# Patient Record
Sex: FEMALE | Race: AMERICAN INDIAN OR ALASKA NATIVE
[De-identification: names, ages, dates, MRNs, and addresses within clinical notes are randomized per-mention and may not be internally consistent; named-entity substitution may affect disease eponyms.]

---

## 2017-06-04 ENCOUNTER — HOSPITAL ENCOUNTER (EMERGENCY)
Dept: HOSPITAL 5 - ED | Age: 32
Discharge: HOME | End: 2017-06-04
Payer: SELF-PAY

## 2017-06-04 VITALS — SYSTOLIC BLOOD PRESSURE: 115 MMHG | DIASTOLIC BLOOD PRESSURE: 70 MMHG

## 2017-06-04 DIAGNOSIS — M79.642: ICD-10-CM

## 2017-06-04 DIAGNOSIS — Z3A.16: ICD-10-CM

## 2017-06-04 DIAGNOSIS — O26.892: Primary | ICD-10-CM

## 2017-06-04 PROCEDURE — 99281 EMR DPT VST MAYX REQ PHY/QHP: CPT

## 2017-06-04 NOTE — EMERGENCY DEPARTMENT REPORT
Upper Extremity





- HPI


Chief Complaint: Extremity Injury, Upper


Stated Complaint: LT HAND INJURED


Time Seen by Provider: 06/04/17 18:59


Upper Extremity: Right Hand (Pain shooting pain in lt hand)


Occurred When: 2 Days


Mechanism: Other (Lifting , changing car tire)


Severity: moderate (6/10)


Symptoms: No Pain with Movement, No Deformity, No Limited Range of Movement, No 

Numbness, No Weakness, No Swelling, No Bruising/Ecchymosis, No Laceration or 

Abrasion


Other History: Patient reports shooting pain in both her hands that started 2 

days ago. Pain resolved to rt hand but remains in lt hand. Shooting from wrist. 

This started after she was changing a tire from car. Took tylenol with little 

relief. No numbmess,to hands. Burning pain. Patient said she is 16 weeks 

pregnant followed by OB/GYN. No complaints of vaginal bleed, abdominal pain or 

back pain. Denies fever or nausea or vomiting.





ED Review of Systems


ROS: 


Stated complaint: LT HAND INJURED


Other details as noted in HPI





Comment: All other systems reviewed and negative


Constitutional: denies: chills, fever


ENT: denies: throat pain


Respiratory: no symptoms reported


Cardiovascular: denies: chest pain, palpitations, edema, syncope


Gastrointestinal: denies: abdominal pain, nausea, vomiting


Musculoskeletal: arthralgia.  denies: back pain, joint swelling, myalgia


Skin: denies: rash


Neurological: paresthesias (burning).  denies: headache, weakness, abnormal gait

, vertigo





ED Past Medical Hx





- Past Medical History


Previous Medical History?: No





- Surgical History


Past Surgical History?: No





- Family History


Family history: no significant





- Social History


Smoking Status: Never Smoker


Substance Use Type: None





- Medications


Home Medications: 


 Home Medications











 Medication  Instructions  Recorded  Confirmed  Last Taken  Type


 


Acetaminophen/Codeine [Tylenol 1 tab PO Q12H PRN #6 tab 06/04/17  Unknown Rx





/Codeine # 3 tab]     














Upper Extremity Exam





- Exam


General: 


Vital signs noted. No distress. Alert and acting appropriately.


This is a 32 yo female well nourished well developed in no acute distress


Head and Torso: No HEENT Abnormality (normal exam), No Neck Tenderness (normal 

exam), No Chest/Lungs Abnormality (normal exam), No Abdominal Tenderness (

normal exam), No Back Tenderness (normal exam)


Shoulder Exam: Yes Normal Range of Motion in Shoulder, No Shoulder Tenderness, 

No Clavicle Tenderness, No Shoulder Deformity, No AC Joint Tenderness


Arm Exam: No Arm/Humerus Tenderness, No Arm Deformity


Elbow: Yes Normal Range of Motion in Elbow, No Elbow Tenderness, No Elbow 

Deformity


Forearm: No Forearm Tenderness, No Forearm Deformity, No Pain with Pronation, 

No Pain with Supination


Wrist: Yes Normal ROM in Wrist, No Wrist Tenderness, No Wrist Deformity, No 

Snuffbox Tenderness, No Pain with Axial Thumb Compression


Hand: Yes Normal ROM in Digit(s), No Hand Tenderness, No Hand Deformity, No 

Digit Tenderness, No Digit(s) Deformity, No Tendon Dysfunction


CMS Exam: Yes Normal Distal Pulses, Yes Normal Capillary Refill, Yes Normal 

Distal Sensation, No Broken Skin





ED Course


 Vital Signs











  06/04/17





  18:37


 


Temperature 98.4 F


 


Pulse Rate 60


 


Respiratory 18





Rate 


 


Blood Pressure 115/70


 


Blood Pressure 115/70





[Right] 


 


O2 Sat by Pulse 100





Oximetry 














- Reevaluation(s)


Reevaluation #1: 





06/04/17 19:50


Patient stable and requesting splint and Orthopedic referral





- Orthopedic Splinting/Casting


  ** Injury #1


Side: left


Upper Extremity Injury Location: wrist


Upper Extremity Immobilizer: wrist splint





ED Medical Decision Making





- Medical Decision Making





ED Course: patient With Arthralgia Lt hand with burning pain. Exam is normal 

withou neurovascular compromise. See procedure note for splinting. I discussed 

dx and treatment plan with patient and she voiced understanding. Patient 

discharge home with prescription for Tylenol #3 and Ortho follow up.


Critical care attestation.: 


If time is entered above; I have spent that time in minutes in the direct care 

of this critically ill patient, excluding procedure time.








ED Disposition


Clinical Impression: 


 Arthralgia of left hand





Disposition: DISCHARGED TO HOME OR SELFCARE


Is pt being admited?: No


Does the pt Need Aspirin: No


Condition: Stable


Instructions:  Arthralgia (ED)


Additional Instructions: 


avoid lifting heavy objects


Follow up with orthopedic


Prescriptions: 


Acetaminophen/Codeine [Tylenol /Codeine # 3 tab] 1 tab PO Q12H PRN #6 tab


 PRN Reason: Pain


Referrals: 


FRANKO AWAD MD [Staff Physician] - 2-3 Days


Forms:  Work/School Release Form(ED)


Print Language: ENGLISH

## 2017-06-26 ENCOUNTER — HOSPITAL ENCOUNTER (EMERGENCY)
Dept: HOSPITAL 5 - ED | Age: 32
Discharge: HOME | End: 2017-06-26
Payer: SELF-PAY

## 2017-06-26 VITALS — SYSTOLIC BLOOD PRESSURE: 122 MMHG | DIASTOLIC BLOOD PRESSURE: 70 MMHG
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
## 2017-06-26 DIAGNOSIS — Z3A.00: ICD-10-CM
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
## 2017-06-26 DIAGNOSIS — S16.1XXA: ICD-10-CM

## 2017-06-26 DIAGNOSIS — O26.892: Primary | ICD-10-CM
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
## 2017-06-26 PROCEDURE — 99283 EMERGENCY DEPT VISIT LOW MDM: CPT
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
## 2017-07-12 ENCOUNTER — HOSPITAL ENCOUNTER (EMERGENCY)
Dept: HOSPITAL 5 - ED | Age: 32
Discharge: HOME | End: 2017-07-12
Payer: SELF-PAY

## 2017-07-12 VITALS — DIASTOLIC BLOOD PRESSURE: 85 MMHG | SYSTOLIC BLOOD PRESSURE: 116 MMHG

## 2017-07-12 DIAGNOSIS — S30.860A: ICD-10-CM
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
## 2017-07-12 DIAGNOSIS — O26.892: Primary | ICD-10-CM

## 2017-07-12 DIAGNOSIS — S40.861A: ICD-10-CM
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
## 2017-07-12 DIAGNOSIS — Z3A.21: ICD-10-CM

## 2017-07-12 PROCEDURE — 99282 EMERGENCY DEPT VISIT SF MDM: CPT

## 2017-07-12 NOTE — EMERGENCY DEPARTMENT REPORT
ED Allergic Reaction HPI





- General


Chief complaint: Allergic Reaction


Stated complaint: ANT BITE/SHORTNESS OF BREATH


Source: patient


Mode of arrival: Ambulatory


Limitations: No Limitations





- History of Present Illness


Initial Comments: 





31 year old female presents to ED with allergic reaction after insect bite x3 

days. patient states she is 21 weeks pregnant. patient states she has insect 

bites on right arm and buttock area. patient states she has attempted to apply 

vaseline to insect bites with no relief. patient denies SOB, diff breathing, 

chest pain, tongue swelling. patient is stable, neurologically intact and in no 

acute distress. 


MD Complaint: allergic reaction


-: days(s) (3)


Exposure: insect bite


Symptoms: rash, itching.  denies: facial swelling, lip swelling, difficulty 

swallowing, difficulty breathing, orolingual swelling


Severity: mild


Treatment Prior to Arrival: none





- Related Data


 Previous Rx's











 Medication  Instructions  Recorded  Last Taken  Type


 


Acetaminophen/Codeine [Tylenol 1 tab PO Q12H PRN #6 tab 06/26/17 Unknown Rx





/Codeine # 3 tab]    











 Allergies











Allergy/AdvReac Type Severity Reaction Status Date / Time


 


No Known Allergies Allergy   Verified 07/28/16 18:17














ED Review of Systems


ROS: 


Stated complaint: ANT BITE/SHORTNESS OF BREATH


Other details as noted in HPI





Constitutional: denies: chills, fever


Eyes: denies: eye pain, eye discharge, vision change


ENT: denies: ear pain, throat pain


Respiratory: denies: cough, shortness of breath, wheezing


Cardiovascular: denies: chest pain, palpitations


Endocrine: no symptoms reported


Gastrointestinal: denies: abdominal pain, nausea, diarrhea


Genitourinary: denies: urgency, dysuria, discharge


Musculoskeletal: denies: back pain, joint swelling, arthralgia


Skin: denies: rash, lesions


Neurological: denies: headache, weakness, paresthesias


Psychiatric: denies: anxiety, depression


Hematological/Lymphatic: denies: easy bleeding, easy bruising





ED Past Medical Hx





- Past Medical History


Previous Medical History?: No





- Surgical History


Past Surgical History?: No





- Social History


Smoking Status: Never Smoker


Substance Use Type: None





- Medications


Home Medications: 


 Home Medications











 Medication  Instructions  Recorded  Confirmed  Last Taken  Type


 


Acetaminophen/Codeine [Tylenol 1 tab PO Q12H PRN #6 tab 06/26/17  Unknown Rx





/Codeine # 3 tab]     














ED Physical Exam





- General


Limitations: No Limitations


General appearance: alert, in no apparent distress





- Head


Head exam: Present: atraumatic, normocephalic





- Eye


Eye exam: Present: normal appearance, EOMI





- ENT


ENT exam: Present: normal exam, mucous membranes moist





- Neck


Neck exam: Present: normal inspection, full ROM.  Absent: tenderness





- Respiratory


Respiratory exam: Present: normal lung sounds bilaterally.  Absent: respiratory 

distress, wheezes





- Cardiovascular


Cardiovascular Exam: Present: regular rate, normal rhythm.  Absent: systolic 

murmur, diastolic murmur, rubs, gallop





- GI/Abdominal


GI/Abdominal exam: Present: soft, normal bowel sounds.  Absent: tenderness





- 


External exam: Present: normal external exam (female RN present during time of 

exam)





- Extremities Exam


Extremities exam: Present: normal inspection, full ROM, other (patient has .5cm 

hive present to right anterior upper arm. no surrounding erythema present)





- Back Exam


Back exam: Present: normal inspection





- Neurological Exam


Neurological exam: Present: alert, oriented X3, normal gait





- Psychiatric


Psychiatric exam: Present: normal affect, normal mood





- Skin


Skin exam: Present: warm, dry, intact, normal color, urticaria (right anterior 

upper arm).  Absent: erythema





ED Course


 Vital Signs











  07/12/17 07/12/17





  05:55 06:42


 


Temperature 98.6 F 98.9 F


 


Pulse Rate 65 89


 


Respiratory 16 16





Rate  


 


Blood Pressure 116/85 


 


O2 Sat by Pulse 98 100





Oximetry  














ED Medical Decision Making





- Medical Decision Making





31 year old female presents to ED with insect bite/mild allergic reaction 

hives. no abscess or surrounding erythema present. patient is 21 weeks pregnant 

and has been instructed to use OTC zyrtec and OTC calamine lotion. patient 

denies SOB, diff breathing, facial swelling, chest pain. patient is stable, 

neurologically intact and in no acute distress. 


Critical care attestation.: 


If time is entered above; I have spent that time in minutes in the direct care 

of this critically ill patient, excluding procedure time.








ED Disposition


Clinical Impression: 


Insect bite


Qualifiers:


 Encounter type: initial encounter Qualified Code(s): W57.XXXA - Bitten or 

stung by nonvenomous insect and other nonvenomous arthropods, initial encounter





Disposition: DC-01 TO HOME OR SELFCARE


Is pt being admited?: No


Does the pt Need Aspirin: No


Condition: Stable


Instructions:  Insect Bite or Sting (ED)


Additional Instructions: 


Please use OTC zyrtec (oral antihistamine) and OTC Calamine lotion for allergic 

reaction/insect bite. 


Referrals: 


PRIMARY CARE,MD [Primary Care Provider] - 3-5 Days


Forms:  Work/School Release Form(ED)

## 2017-08-07 ENCOUNTER — HOSPITAL ENCOUNTER (OUTPATIENT)
Dept: HOSPITAL 5 - TRG | Age: 32
LOS: 1 days | Discharge: HOME | End: 2017-08-08
Attending: OBSTETRICS & GYNECOLOGY
Payer: COMMERCIAL

## 2017-08-07 VITALS — SYSTOLIC BLOOD PRESSURE: 112 MMHG | DIASTOLIC BLOOD PRESSURE: 68 MMHG

## 2017-08-07 DIAGNOSIS — Z3A.24: ICD-10-CM

## 2017-08-07 DIAGNOSIS — O21.2: Primary | ICD-10-CM

## 2017-08-07 LAB
BILIRUB UR QL STRIP: (no result)
BLOOD UR QL VISUAL: (no result)
KETONES UR STRIP-MCNC: (no result) MG/DL
LEUKOCYTE ESTERASE UR QL STRIP: (no result)
MUCOUS THREADS #/AREA URNS HPF: (no result) /HPF
NITRITE UR QL STRIP: (no result)
PH UR STRIP: 6 [PH] (ref 5–7)
PROT UR STRIP-MCNC: (no result) MG/DL
RBC #/AREA URNS HPF: 1 /HPF (ref 0–6)
UROBILINOGEN UR-MCNC: < 2 MG/DL (ref ?–2)
WBC #/AREA URNS HPF: 1 /HPF (ref 0–6)

## 2017-08-07 PROCEDURE — 81001 URINALYSIS AUTO W/SCOPE: CPT

## 2017-08-07 PROCEDURE — 59025 FETAL NON-STRESS TEST: CPT

## 2017-08-07 PROCEDURE — 80307 DRUG TEST PRSMV CHEM ANLYZR: CPT

## 2017-08-07 PROCEDURE — 96360 HYDRATION IV INFUSION INIT: CPT

## 2017-08-08 LAB — URINE DRUGS OF ABUSE NOTE: (no result)

## 2017-12-18 ENCOUNTER — HOSPITAL ENCOUNTER (EMERGENCY)
Dept: HOSPITAL 5 - ED | Age: 32
Discharge: HOME | End: 2017-12-18
Payer: COMMERCIAL

## 2017-12-18 VITALS — DIASTOLIC BLOOD PRESSURE: 51 MMHG | SYSTOLIC BLOOD PRESSURE: 117 MMHG

## 2017-12-18 DIAGNOSIS — Z87.891: ICD-10-CM

## 2017-12-18 DIAGNOSIS — R06.00: Primary | ICD-10-CM

## 2017-12-18 LAB
ALBUMIN SERPL-MCNC: 3.9 G/DL (ref 3.9–5)
ALBUMIN/GLOB SERPL: 1.5 %
ALP SERPL-CCNC: 82 UNITS/L (ref 35–129)
ALT SERPL-CCNC: 16 UNITS/L (ref 7–56)
ANION GAP SERPL CALC-SCNC: 19 MMOL/L
APTT BLD: 30 SEC. (ref 24.2–36.6)
BACTERIA #/AREA URNS HPF: (no result) /HPF
BASOPHILS NFR BLD AUTO: 0.3 % (ref 0–1.8)
BILIRUB DIRECT SERPL-MCNC: < 0.2 MG/DL (ref 0–0.2)
BILIRUB INDIRECT SERPL-MCNC: 0 MG/DL
BILIRUB SERPL-MCNC: < 0.2 MG/DL (ref 0.1–1.2)
BILIRUB UR QL STRIP: (no result)
BLOOD UR QL VISUAL: (no result)
BUN SERPL-MCNC: 16 MG/DL (ref 7–17)
BUN/CREAT SERPL: 20 %
CALCIUM SERPL-MCNC: 9.6 MG/DL (ref 8.4–10.2)
CHLORIDE SERPL-SCNC: 102.4 MMOL/L (ref 98–107)
CO2 SERPL-SCNC: 24 MMOL/L (ref 22–30)
EOSINOPHIL NFR BLD AUTO: 1.8 % (ref 0–4.3)
GLUCOSE SERPL-MCNC: 77 MG/DL (ref 65–100)
HCT VFR BLD CALC: 33.4 % (ref 30.3–42.9)
HGB BLD-MCNC: 11.1 GM/DL (ref 10.1–14.3)
INR PPP: 0.91 (ref 0.87–1.13)
KETONES UR STRIP-MCNC: (no result) MG/DL
LEUKOCYTE ESTERASE UR QL STRIP: (no result)
MCH RBC QN AUTO: 31 PG (ref 28–32)
MCHC RBC AUTO-ENTMCNC: 33 % (ref 30–34)
MCV RBC AUTO: 94 FL (ref 79–97)
MUCOUS THREADS #/AREA URNS HPF: (no result) /HPF
NITRITE UR QL STRIP: (no result)
PH UR STRIP: 5 [PH] (ref 5–7)
PLATELET # BLD: 305 K/MM3 (ref 140–440)
POTASSIUM SERPL-SCNC: 4.4 MMOL/L (ref 3.6–5)
PROT SERPL-MCNC: 6.5 G/DL (ref 6.3–8.2)
PROT UR STRIP-MCNC: (no result) MG/DL
RBC # BLD AUTO: 3.58 M/MM3 (ref 3.65–5.03)
RBC #/AREA URNS HPF: < 1 /HPF (ref 0–6)
SODIUM SERPL-SCNC: 141 MMOL/L (ref 137–145)
UROBILINOGEN UR-MCNC: < 2 MG/DL (ref ?–2)
WBC # BLD AUTO: 5 K/MM3 (ref 4.5–11)
WBC #/AREA URNS HPF: 2 /HPF (ref 0–6)

## 2017-12-18 PROCEDURE — 96375 TX/PRO/DX INJ NEW DRUG ADDON: CPT

## 2017-12-18 PROCEDURE — 71275 CT ANGIOGRAPHY CHEST: CPT

## 2017-12-18 PROCEDURE — 85730 THROMBOPLASTIN TIME PARTIAL: CPT

## 2017-12-18 PROCEDURE — 71010: CPT

## 2017-12-18 PROCEDURE — 84484 ASSAY OF TROPONIN QUANT: CPT

## 2017-12-18 PROCEDURE — 99284 EMERGENCY DEPT VISIT MOD MDM: CPT

## 2017-12-18 PROCEDURE — 80048 BASIC METABOLIC PNL TOTAL CA: CPT

## 2017-12-18 PROCEDURE — 74177 CT ABD & PELVIS W/CONTRAST: CPT

## 2017-12-18 PROCEDURE — 93005 ELECTROCARDIOGRAM TRACING: CPT

## 2017-12-18 PROCEDURE — 96376 TX/PRO/DX INJ SAME DRUG ADON: CPT

## 2017-12-18 PROCEDURE — 85025 COMPLETE CBC W/AUTO DIFF WBC: CPT

## 2017-12-18 PROCEDURE — 36415 COLL VENOUS BLD VENIPUNCTURE: CPT

## 2017-12-18 PROCEDURE — 93010 ELECTROCARDIOGRAM REPORT: CPT

## 2017-12-18 PROCEDURE — 85610 PROTHROMBIN TIME: CPT

## 2017-12-18 PROCEDURE — 93975 VASCULAR STUDY: CPT

## 2017-12-18 PROCEDURE — 96374 THER/PROPH/DIAG INJ IV PUSH: CPT

## 2017-12-18 PROCEDURE — 80074 ACUTE HEPATITIS PANEL: CPT

## 2017-12-18 PROCEDURE — 81001 URINALYSIS AUTO W/SCOPE: CPT

## 2017-12-18 NOTE — XRAY REPORT
AP CHEST:



HISTORY: Shortness of breath



AP view of the chest demonstrates a normal mediastinal and

cardiac contour with clear lungs and normal bony and soft tissue

structures.



IMPRESSION:

Unremarkable AP chest.

## 2017-12-18 NOTE — CAT SCAN REPORT
CTA CHEST:



HISTORY: Chest pain, shortness of breath.



COMPARISON: none.



TECHNIQUE: Helical CT in 1.25mm intervals following IV contrast.  

Pulmonary embolus protocol.  Sagittal and coronal reformatted images.  

Rotational MIP images.



FINDINGS:





Contrast bolus is satisfactory.  No pulmonary embolus is identified.



Thyroid gland: Normal.



Tracheobronchial tree: Normal.



Esophagus: Normal.



Heart: Normal.



Pericardium: Normal.



Mediastinum: Normal.



Lung Fields: normal.



Pleural Spaces: Normal.



Musculoskeletal: Normal.





IMPRESSION: 

No evidence for pulmonary embolus.  

Unremarkable CT chest with contrast.

## 2017-12-18 NOTE — ULTRASOUND REPORT
FINAL REPORT



EXAM:  US PELVIS DUPLEX DOPPLER COMP



HISTORY:  lower abd pain 



TECHNIQUE:  Transabdominal pelvic ultrasound.



PRIORS:  None currently available.



FINDINGS:  





Uterus: 10.8 x 5.1 x 6.8 cm. Anteverted. No distinct lesions.

Homogeneous. 



Endometrium: 13 mm. Echogenic Aleshia heterogeneous. Within normal

limits for a menstruating patient. 



Right ovary: Not examined because patient requested examination

to terminate 



Left ovary: 4.0 x 2.1 x 2.7 cm. 2.3 cm follicular type cyst. No

complexity. Flow is present to the left ovary. 



No adnexal lesions. 



No significant free fluid. 



IMPRESSION:  

Left ovarian follicular type cyst. 



Right ovary not examined because patient ended study prematurely.





Otherwise unremarkable.

## 2017-12-18 NOTE — CAT SCAN REPORT
CT ABDOMEN PELVIS WITH CONTRAST:



HISTORY:  abdominal pain.



COMPARISON: none.



TECHNIQUE:  Helical CT in 1.25mm intervals following IV contrast. 

Sagittal and coronal reconstructions. 





FINDINGS:



Lung bases: Normal.



Liver: Normal.



Biliary system: Normal.



Pancreas: Normal.



Spleen: Normal.



Kidneys/ureters/bladder: Normal.



Adrenal glands: Normal.



Aorta: Normal.



Intestines: Normal.



Appendix: Normal.



Pelvic viscera: Postpartum uterus. The right ovary is normal. A 2.1 cm 

left ovarian cyst is identified.



Ascites: None.



Adenopathy: None.



Musculoskeletal: Normal.





IMPRESSION:

No acute abdominal process is identified.

2.1 cm left ovarian cyst.

## 2017-12-21 ENCOUNTER — HOSPITAL ENCOUNTER (EMERGENCY)
Dept: HOSPITAL 5 - ED | Age: 32
LOS: 1 days | Discharge: LEFT BEFORE DISCHARGE | End: 2017-12-22
Payer: COMMERCIAL

## 2017-12-21 DIAGNOSIS — R10.9: Primary | ICD-10-CM

## 2017-12-21 DIAGNOSIS — Z53.21: ICD-10-CM

## 2017-12-21 DIAGNOSIS — R06.09: ICD-10-CM

## 2017-12-27 ENCOUNTER — HOSPITAL ENCOUNTER (EMERGENCY)
Dept: HOSPITAL 5 - ED | Age: 32
Discharge: LEFT BEFORE DISCHARGE | End: 2017-12-27
Payer: COMMERCIAL

## 2017-12-27 VITALS — SYSTOLIC BLOOD PRESSURE: 173 MMHG | DIASTOLIC BLOOD PRESSURE: 78 MMHG

## 2017-12-27 DIAGNOSIS — R06.02: Primary | ICD-10-CM

## 2017-12-27 DIAGNOSIS — Z53.21: ICD-10-CM

## 2018-02-01 ENCOUNTER — HOSPITAL ENCOUNTER (EMERGENCY)
Dept: HOSPITAL 5 - ED | Age: 33
Discharge: HOME | End: 2018-02-01
Payer: COMMERCIAL

## 2018-02-01 VITALS — DIASTOLIC BLOOD PRESSURE: 90 MMHG | SYSTOLIC BLOOD PRESSURE: 175 MMHG

## 2018-02-01 DIAGNOSIS — J40: Primary | ICD-10-CM

## 2018-02-01 LAB
BASOPHILS # (AUTO): 0 K/MM3 (ref 0–0.1)
BASOPHILS NFR BLD AUTO: 0.5 % (ref 0–1.8)
BUN SERPL-MCNC: 19 MG/DL (ref 7–17)
BUN/CREAT SERPL: 19 %
CALCIUM SERPL-MCNC: 9.6 MG/DL (ref 8.4–10.2)
EOSINOPHIL # BLD AUTO: 0.1 K/MM3 (ref 0–0.4)
EOSINOPHIL NFR BLD AUTO: 2.3 % (ref 0–4.3)
HCT VFR BLD CALC: 39.7 % (ref 30.3–42.9)
HEMOLYSIS INDEX: 7
HGB BLD-MCNC: 13.2 GM/DL (ref 10.1–14.3)
LYMPHOCYTES # BLD AUTO: 2.3 K/MM3 (ref 1.2–5.4)
LYMPHOCYTES NFR BLD AUTO: 44.2 % (ref 13.4–35)
MCH RBC QN AUTO: 31 PG (ref 28–32)
MCHC RBC AUTO-ENTMCNC: 33 % (ref 30–34)
MCV RBC AUTO: 92 FL (ref 79–97)
MONOCYTES # (AUTO): 0.5 K/MM3 (ref 0–0.8)
MONOCYTES % (AUTO): 8.8 % (ref 0–7.3)
PLATELET # BLD: 316 K/MM3 (ref 140–440)
RBC # BLD AUTO: 4.3 M/MM3 (ref 3.65–5.03)

## 2018-02-01 PROCEDURE — 93010 ELECTROCARDIOGRAM REPORT: CPT

## 2018-02-01 PROCEDURE — 84703 CHORIONIC GONADOTROPIN ASSAY: CPT

## 2018-02-01 PROCEDURE — 93005 ELECTROCARDIOGRAM TRACING: CPT

## 2018-02-01 PROCEDURE — 85025 COMPLETE CBC W/AUTO DIFF WBC: CPT

## 2018-02-01 PROCEDURE — 71046 X-RAY EXAM CHEST 2 VIEWS: CPT

## 2018-02-01 PROCEDURE — 84484 ASSAY OF TROPONIN QUANT: CPT

## 2018-02-01 PROCEDURE — 80048 BASIC METABOLIC PNL TOTAL CA: CPT

## 2018-02-01 PROCEDURE — 36415 COLL VENOUS BLD VENIPUNCTURE: CPT

## 2018-02-01 NOTE — EMERGENCY DEPARTMENT REPORT
HPI





- General


Chief Complaint: Chest Pain


Time Seen by Provider: 02/01/18 09:55





- HPI


HPI: 





The patient is a 32-year-old female presents for evaluation of cough and chest 

pain.  She states that has experienced on and off cough and chest pain for the 

past 4-6 weeks.  She states that her pain is currently 9/10 in severity, 

Worsened and severe for the past one week, exacerbated with coughing, aching in 

quality. The patient denies fever, neck pain, parasthesias, hemoptysis, 

palpitations, dizziness, syncope, unilateral leg swelling, calf muscle pain.  

Patient also denies cocaine or other stimulant use, oral contraception use, 

history of DVT or PE, recent immobilization, or history of cancer.








ED Past Medical Hx





- Past Medical History


Previous Medical History?: Yes


Hx Hypertension: No


Hx Diabetes: No


Hx Deep Vein Thrombosis: No


Hx Renal Disease: No


Hx Sickle Cell Disease: No


Hx Seizures: No


Hx Asthma: No


Hx HIV: No


Additional medical history: Vaginal delivery x 3





- Surgical History


Past Surgical History?: No





- Social History


Smoking Status: Former Smoker


Substance Use Type: None





- Medications


Home Medications: 


 Home Medications











 Medication  Instructions  Recorded  Confirmed  Last Taken  Type


 


Acetaminophen/Codeine [Tylenol 1 tab PO Q12H PRN #6 tab 06/26/17  Unknown Rx





/Codeine # 3 tab]     


 


Naproxen [Naprosyn] 500 mg PO BID #10 tablet 12/18/17  Unknown Rx


 


Azithromycin [Zithromax Z-МАРИНА] 250 mg PO QDAY #6 tablet 02/01/18  Unknown Rx


 


Benzonatate [Tessalon Perles] 100 mg PO Q8HR #20 capsule 02/01/18  Unknown Rx


 


Ibuprofen [Motrin] 800 mg PO Q8HR PRN #15 tablet 02/01/18  Unknown Rx


 


traMADol [Ultram 50 MG tab] 50 mg PO Q6HR PRN #10 tablet 02/01/18  Unknown Rx














ED Review of Systems


ROS: 


Stated complaint: URI SX


Other details as noted in HPI





Constitutional: denies: fever


ENT: denies: throat or neck pain


Respiratory: denies: cough reports shortness of breath


Cardiovascular: reports chest pain


Endocrine: denies unexplained weight loss or gain


Gastrointestinal: denies: abdominal pain, nausea


Genitourinary: denies: dysuria


Musculoskeletal: denies: leg swelling


Skin: denies: rash


Neurological: denies: headache


Hematological/Lymphatic: denies: easy bleeding or easy bruising  


Psych: denies sadness or hopelessness











Physical Exam





- Physical Exam


Vital Signs: 


 Vital Signs











  02/01/18 02/01/18





  01:53 10:00


 


Temperature 98.6 F 98.1 F


 


Pulse Rate 58 L 55 L


 


Respiratory 18 23





Rate  


 


Blood Pressure 134/74 


 


Blood Pressure  138/75





[Left]  


 


O2 Sat by Pulse 98 98





Oximetry  











Physical Exam: 








General: well-nourished, well-developed, no acute distress


Head: Normocephalic, atraumatic


Eyes: normal sclera


ENT: Mucous membranes are pale and dry


Neck: trachea midline, neck supple, No neck stiffness, no cervical adenopathy


Respiratory: Breath sounds equal bilaterally, no wheezing, rales, or rhonchi


Cardio: S1 and S2 present, no murmurs, rubs, gallops, capillary refill is 

delayed


Abdomen: Normoactive bowel sounds, soft abdomen, no rigidity, no guarding or 

rebound tenderness


Musc: No pitting edema


Skin: No rash


Neuro: no facial drooping, normal speech


Psych: Normal affect








ED Course


 Vital Signs











  02/01/18 02/01/18





  01:53 10:00


 


Temperature 98.6 F 98.1 F


 


Pulse Rate 58 L 55 L


 


Respiratory 18 23





Rate  


 


Blood Pressure 134/74 


 


Blood Pressure  138/75





[Left]  


 


O2 Sat by Pulse 98 98





Oximetry  














ED Medical Decision Making





- Lab Data


Result diagrams: 


 02/01/18 02:20





 02/01/18 02:20





- Medical Decision Making








The patient was seen and examined by myself.  The patient is placed on a 

cardiac monitor and continuous pulse ox. On initial evaluation, the patient was 

found to be in no distress.  EKG was negative for findings suggestive of acute 

cardiac infarct. Labs and imaging are obtained.  The patient is given a tablet 

of Motrin for her pain.  Chest x-ray is negative for pneumothorax, focal 

consolidation, pulmonary vascular congestion, pleural effusion, or other 

obvious acute cardiopulmonary disease process.  Lab results were non-concerning 

including levels of troponin, WBC, hemoglobin, hematocrit, electrolytes, renal 

function. The patient was reevaluated and reported that their symptoms were 

markedly improved.  As the patient has a DOROTHY risk score less than 2, and a well

's score less than 2, the patient is at low risk of ACS or pulmonary emboli 

etiology of their symptoms. The patient is stable for discharge with outpatient 

follow-up.  The patient is given follow-up and return instructions.  The 

patient expressed understanding and agreed with the plan.  The patient is 

discharged in stable condition.





Critical care attestation.: 


If time is entered above; I have spent that time in minutes in the direct care 

of this critically ill patient, excluding procedure time.








ED Disposition


Clinical Impression: 


 Acute chest pain, Bronchitis





Disposition: DC-01 TO HOME OR SELFCARE


Is pt being admited?: No


Does the pt Need Aspirin: No


Condition: Stable


Instructions:  Chest Pain (ED), Costochondritis (ED), Chronic Bronchitis (ED)


Prescriptions: 


Azithromycin [Zithromax Z-МАРИНА] 250 mg PO QDAY #6 tablet


Benzonatate [Tessalon Perles] 100 mg PO Q8HR #20 capsule


Ibuprofen [Motrin] 800 mg PO Q8HR PRN #15 tablet


 PRN Reason: Pain


traMADol [Ultram 50 MG tab] 50 mg PO Q6HR PRN #10 tablet


 PRN Reason: Pain


Referrals: 


TRINA LOPEZ PA [Primary Care Provider] - 3-5 Days


Time of Disposition: 11:11

## 2018-05-16 ENCOUNTER — HOSPITAL ENCOUNTER (EMERGENCY)
Dept: HOSPITAL 5 - ED | Age: 33
Discharge: HOME | End: 2018-05-16
Payer: COMMERCIAL

## 2018-05-16 VITALS — DIASTOLIC BLOOD PRESSURE: 69 MMHG | SYSTOLIC BLOOD PRESSURE: 159 MMHG

## 2018-05-16 DIAGNOSIS — L25.9: Primary | ICD-10-CM

## 2018-05-16 PROCEDURE — 99282 EMERGENCY DEPT VISIT SF MDM: CPT

## 2018-05-16 NOTE — EMERGENCY DEPARTMENT REPORT
HPI





- General


Chief Complaint: Skin Rash


Time Seen by Provider: 05/16/18 14:12





- HPI


HPI: 





Patient is a 32-year-old female who presents a rash to her right wrist region.  

Patient states that on Sunday 4 days ago she went to [facility] and brought a 

sunburn gel which she applied to her wrist.  Patient states since then she has 

had some rash on her wrists where she applied the gel.


Patient states that she is currently itching intermittently throughout the day.

  She denies fevers/chills/nausea vomiting or any other symptoms.





ED Past Medical Hx





- Past Medical History


Hx Hypertension: No


Hx Diabetes: No


Hx Deep Vein Thrombosis: No


Hx Renal Disease: No


Hx Sickle Cell Disease: No


Hx Seizures: No


Hx Asthma: No


Hx HIV: No


Additional medical history: Vaginal delivery x 3





- Social History


Smoking Status: Never Smoker


Substance Use Type: None





- Medications


Home Medications: 


 Home Medications











 Medication  Instructions  Recorded  Confirmed  Last Taken  Type


 


Acetaminophen/Codeine [Tylenol 1 tab PO Q12H PRN #6 tab 06/26/17  Unknown Rx





/Codeine # 3 tab]     


 


Naproxen [Naprosyn] 500 mg PO BID #10 tablet 12/18/17  Unknown Rx


 


Azithromycin [Zithromax Z-МАРИНА] 250 mg PO QDAY #6 tablet 02/01/18  Unknown Rx


 


Benzonatate [Tessalon Perles] 100 mg PO Q8HR #20 capsule 02/01/18  Unknown Rx


 


Ibuprofen [Motrin] 800 mg PO Q8HR PRN #15 tablet 02/01/18  Unknown Rx


 


traMADol [Ultram 50 MG tab] 50 mg PO Q6HR PRN #10 tablet 02/01/18  Unknown Rx


 


Hydrocortisone 0.5% 1 applicatio TP TID #1 tube 05/16/18  Unknown Rx





[Hydrocortisone 0.5% CREAM]     


 


diphenhydrAMINE [Benadryl CAP] 25 mg PO QHS PRN #30 capsule 05/16/18  Unknown Rx














ED Review of Systems


ROS: 


Stated complaint: RASH ON HAND AND EYE


Other details as noted in HPI





Constitutional: denies: chills, fever


Eyes: denies: eye pain, eye discharge, vision change


ENT: denies: ear pain, throat pain


Respiratory: denies: cough, shortness of breath, wheezing


Cardiovascular: denies: chest pain, palpitations


Endocrine: no symptoms reported


Gastrointestinal: denies: abdominal pain, nausea, diarrhea


Genitourinary: denies: urgency, dysuria, discharge


Musculoskeletal: denies: back pain, joint swelling, arthralgia


Skin: denies: rash, lesions


Neurological: denies: headache, weakness, paresthesias


Psychiatric: denies: anxiety, depression


Hematological/Lymphatic: denies: easy bleeding, easy bruising





Physical Exam





- Physical Exam


Vital Signs: 


 Vital Signs











  05/16/18





  12:48


 


Temperature 97.9 F


 


Pulse Rate 64


 


Respiratory 18





Rate 


 


Blood Pressure 159/69


 


O2 Sat by Pulse 100





Oximetry 











Physical Exam: 





GENERAL: Alert and oriented x3, no apparent distress, Normal Gait, atraumatic.


HEAD: Head is normocephalic and a-traumatic.





MOUTH:Mouth is well hydrated and without lesions. Tonsils nonerythematous or 

swollen,  Uvula midline, Tongue not elevated. Mucous membranes are moist. 

Posterior pharynx clear, no exudate or lesions. Patent airways.


NECK: Supple. Non edematous, No carotid bruits.  No lymphadenopathy or 

thyromegaly.  No C-spine tenderness


LUNGS: Symetrical with respiration, No wheezing, no rales or crackles, CTAB.


HEART:  S1, S2 present, regular rate and rhythm without murmur, no rubs, no 

gallops. Non tender to palpation





SKIN:  Warm and dry, generalized, erythematous, pinpoint rash localized to 

right wrist region.  Nontender to palpation, not draining, No other lesions, No 

ulceration or induration present.  Tattoos seen on wrist and arm











ED Course


 Vital Signs











  05/16/18





  12:48


 


Temperature 97.9 F


 


Pulse Rate 64


 


Respiratory 18





Rate 


 


Blood Pressure 159/69


 


O2 Sat by Pulse 100





Oximetry 














ED Medical Decision Making





- Medical Decision Making


31-year-old female presents with simple contact dermatitis


Discussed Benadryl and hydrocortisone home medications


Patient had no respiratory acute distress in in the ED


Discussed to follow up with primary care.





Critical care attestation.: 


If time is entered above; I have spent that time in minutes in the direct care 

of this critically ill patient, excluding procedure time.








ED Disposition


Clinical Impression: 


 Contact dermatitis





Disposition: DC-01 TO HOME OR SELFCARE


Is pt being admited?: No


Does the pt Need Aspirin: No


Condition: Stable


Instructions:  Contact Dermatitis (ED)


Additional Instructions: 


Make sure to follow up with the primary care physician as discussed.


Take all your medications as you've been prescribed.


If you have any worsening symptoms or develop new symptoms please return to ED 

immediately.


Prescriptions: 


diphenhydrAMINE [Benadryl CAP] 25 mg PO QHS PRN #30 capsule


 PRN Reason: Allergic Reaction


Hydrocortisone 0.5% [Hydrocortisone 0.5% CREAM] 1 applicatio TP TID #1 tube


Referrals: 


PRIMARY CARE,MD [Primary Care Provider] - 3-5 Days


[facility] [Outside] - 3-5 Days


[facility] [Outside] - 3-5 Days


Forms:  Work/School Release Form(ED)


Time of Disposition: 14:58

## 2018-10-03 ENCOUNTER — HOSPITAL ENCOUNTER (EMERGENCY)
Dept: HOSPITAL 5 - ED | Age: 33
Discharge: HOME | End: 2018-10-03
Payer: COMMERCIAL

## 2018-10-03 VITALS — SYSTOLIC BLOOD PRESSURE: 130 MMHG | DIASTOLIC BLOOD PRESSURE: 89 MMHG

## 2018-10-03 DIAGNOSIS — J40: Primary | ICD-10-CM

## 2018-10-03 PROCEDURE — 99283 EMERGENCY DEPT VISIT LOW MDM: CPT

## 2018-10-03 PROCEDURE — 71046 X-RAY EXAM CHEST 2 VIEWS: CPT

## 2018-10-03 NOTE — EMERGENCY DEPARTMENT REPORT
- General


Chief Complaint: Upper Respiratory Infection


Stated Complaint: COLD


Time Seen by Provider: 10/03/18 09:04


Source: patient


Mode of arrival: Ambulatory


Limitations: No Limitations





- History of Present Illness


Initial Comments: 





This is a 32-year-old female nontoxic, well nourished in appearance, no acute 

signs of distress presents to the ED with c/o of productive cough, sore throat, 

ear pain, body aches, rhinorrhea, nasal congestion x1 week.  Patient describes 

productive cough as yellow mucus production.  Patient denies any sick contact.  

Patient denies any recent travels, long car, recent hospital stays.  Patient 

denies any calf pain or calf tenderness.  Patient denies any chest pain, short 

of breath, fever, chills, nausea, vomiting, hemoptysis, numbness, tingling, 

headache or stiff neck. Patient denies any allergies or significant PMH.


MD Complaint: cough, sore throat, rhinorrhea, nasal congestion


-: week(s) (1)


Severity: mild


Severity scale (0 -10): 8


Quality: aching


Consistency: constant


Improves With: nothing


Worsens With: nothing


Associated Symptoms: rhinorrhea, nasal congestion, sore throat, cough, ear 

pain.  denies: fever, chills, myalgias, diaphoresis, headache, stiff neck, 

chest pain, shortness of breath, abdominal pain, nausea, vomiting, diarrhea, 

dysuria, rash, confusion, right sweats, epistaxis, hoarseness


Treatments Prior to Arrival: none





- Related Data


 Previous Rx's











 Medication  Instructions  Recorded  Last Taken  Type


 


Acetaminophen/Codeine [Tylenol 1 tab PO Q12H PRN #6 tab 06/26/17 Unknown Rx





/Codeine # 3 tab]    


 


Naproxen [Naprosyn] 500 mg PO BID #10 tablet 12/18/17 Unknown Rx


 


Azithromycin [Zithromax Z-МАРИНА] 250 mg PO QDAY #6 tablet 02/01/18 Unknown Rx


 


Benzonatate [Tessalon Perles] 100 mg PO Q8HR #20 capsule 02/01/18 Unknown Rx


 


Ibuprofen [Motrin] 800 mg PO Q8HR PRN #15 tablet 02/01/18 Unknown Rx


 


traMADol [Ultram 50 MG tab] 50 mg PO Q6HR PRN #10 tablet 02/01/18 Unknown Rx


 


Hydrocortisone 0.5% 1 applicatio TP TID #1 tube 05/16/18 Unknown Rx





[Hydrocortisone 0.5% CREAM]    


 


diphenhydrAMINE [Benadryl CAP] 25 mg PO QHS PRN #30 capsule 05/16/18 Unknown Rx


 


Azithromycin [Zithromax Z-МАРИНА] 250 mg PO DAILY #6 tablet 10/03/18 Unknown Rx


 


Benzonatate [Tessalon Perle] 100 mg PO Q8H PRN #20 capsule 10/03/18 Unknown Rx


 


Ibuprofen [Motrin] 600 mg PO Q8H PRN #20 tablet 10/03/18 Unknown Rx


 


Prednisone [predniSONE 10 mg 10 mg PO .TAPER #1 tab.ds.pk 10/03/18 Unknown Rx





(6-Day Pack, 21 Tabs)]    











 Allergies











Allergy/AdvReac Type Severity Reaction Status Date / Time


 


No Known Allergies Allergy   Verified 10/03/18 08:39














ED Review of Systems


ROS: 


Stated complaint: COLD


Other details as noted in HPI





Constitutional: denies: chills, fever


Eyes: denies: eye pain, eye discharge, vision change


ENT: ear pain, throat pain


Respiratory: cough.  denies: shortness of breath, wheezing


Cardiovascular: denies: chest pain, palpitations


Endocrine: no symptoms reported


Gastrointestinal: denies: abdominal pain, nausea, diarrhea


Genitourinary: denies: urgency, dysuria, discharge


Musculoskeletal: denies: back pain, joint swelling, arthralgia


Skin: denies: rash, lesions


Neurological: denies: headache, weakness, paresthesias


Psychiatric: denies: anxiety, depression


Hematological/Lymphatic: denies: easy bleeding, easy bruising





ED Past Medical Hx





- Past Medical History


Previous Medical History?: No


Hx Hypertension: No


Hx Diabetes: No


Hx Deep Vein Thrombosis: No


Hx Renal Disease: No


Hx Sickle Cell Disease: No


Hx Seizures: No


Hx Asthma: No


Hx HIV: No


Additional medical history: Vaginal delivery x 3





- Surgical History


Past Surgical History?: No





- Social History


Smoking Status: Never Smoker


Substance Use Type: None





- Medications


Home Medications: 


 Home Medications











 Medication  Instructions  Recorded  Confirmed  Last Taken  Type


 


Acetaminophen/Codeine [Tylenol 1 tab PO Q12H PRN #6 tab 06/26/17  Unknown Rx





/Codeine # 3 tab]     


 


Naproxen [Naprosyn] 500 mg PO BID #10 tablet 12/18/17  Unknown Rx


 


Azithromycin [Zithromax Z-МАРИНА] 250 mg PO QDAY #6 tablet 02/01/18  Unknown Rx


 


Benzonatate [Tessalon Perles] 100 mg PO Q8HR #20 capsule 02/01/18  Unknown Rx


 


Ibuprofen [Motrin] 800 mg PO Q8HR PRN #15 tablet 02/01/18  Unknown Rx


 


traMADol [Ultram 50 MG tab] 50 mg PO Q6HR PRN #10 tablet 02/01/18  Unknown Rx


 


Hydrocortisone 0.5% 1 applicatio TP TID #1 tube 05/16/18  Unknown Rx





[Hydrocortisone 0.5% CREAM]     


 


diphenhydrAMINE [Benadryl CAP] 25 mg PO QHS PRN #30 capsule 05/16/18  Unknown Rx


 


Azithromycin [Zithromax Z-МАРИНА] 250 mg PO DAILY #6 tablet 10/03/18  Unknown Rx


 


Benzonatate [Tessalon Perle] 100 mg PO Q8H PRN #20 capsule 10/03/18  Unknown Rx


 


Ibuprofen [Motrin] 600 mg PO Q8H PRN #20 tablet 10/03/18  Unknown Rx


 


Prednisone [predniSONE 10 mg 10 mg PO .TAPER #1 tab.ds.pk 10/03/18  Unknown Rx





(6-Day Pack, 21 Tabs)]     














ED Physical Exam





- General


Limitations: No Limitations


General appearance: alert, in no apparent distress





- Head


Head exam: Present: atraumatic, normocephalic





- Eye


Eye exam: Present: normal appearance


Pupils: Present: normal accommodation





- ENT


ENT exam: Present: normal exam, normal orophraynx, mucous membranes moist, TM's 

normal bilaterally, normal external ear exam





- Neck


Neck exam: Present: normal inspection, full ROM.  Absent: tenderness, 

meningismus, lymphadenopathy





- Respiratory


Respiratory exam: Present: normal lung sounds bilaterally.  Absent: respiratory 

distress, wheezes, rales, rhonchi, stridor, chest wall tenderness, accessory 

muscle use, decreased breath sounds, prolonged expiratory





- Cardiovascular


Cardiovascular Exam: Present: regular rate, normal rhythm, normal heart sounds.

  Absent: irregular rhythm, systolic murmur, diastolic murmur, rubs, gallop





- GI/Abdominal


GI/Abdominal exam: Present: soft, normal bowel sounds.  Absent: distended, 

tenderness, guarding, rebound, rigid, diminished bowel sounds





- Extremities Exam


Extremities exam: Present: normal inspection, full ROM, normal capillary 

refill.  Absent: tenderness





- Back Exam


Back exam: Present: normal inspection, full ROM.  Absent: tenderness, CVA 

tenderness (R), CVA tenderness (L), muscle spasm, paraspinal tenderness, 

vertebral tenderness, rash noted





- Neurological Exam


Neurological exam: Present: alert, oriented X3, normal gait





- Psychiatric


Psychiatric exam: Present: normal affect, normal mood





- Skin


Skin exam: Present: warm, dry, intact, normal color.  Absent: rash





ED Course


 Vital Signs











  10/03/18 10/03/18





  08:39 09:10


 


Temperature 98.4 F 


 


Pulse Rate 99 H 


 


Respiratory 20 18





Rate  


 


Blood Pressure 130/89 


 


O2 Sat by Pulse 98 





Oximetry  














- Reevaluation(s)


Reevaluation #1: 





10/03/18 09:28


Patient is speaking in full sentences with no signs of distress noted.





ED Medical Decision Making





- Medical Decision Making





This is a 32-year-old female that presents with bronchitis.  Patient is stable 

and was examined by me.  Chest x-ray has been obtained and dictated by 

radiologist with normal exam.  Patient is notified of x-ray results with no 

questions noted. Due to patient having symptoms of upper respiratory infection 

and worsening I will treat patient empirically with zpak.   Patient was 

instructed to increase hydration, rest and take Motrin for fever episodes.  

Patient received motrin and tesslone perrls in the ED.  Vitals stable. Patient 

is nonfebrile and normal heart rate. Patient was instructed Follow-up with a 

primary care doctor in 3-5 days or if symptoms worsen and continue return to 

emergency room as soon as possible.  At time time of discharge, the patient 

does not seem toxic or ill in appearance.  No acute signs of distress noted.  

Patient agrees to discharge treatment plan of care.  No further questions noted 

by the patient.


Critical care attestation.: 


If time is entered above; I have spent that time in minutes in the direct care 

of this critically ill patient, excluding procedure time.








ED Disposition


Clinical Impression: 


 Bronchitis





Disposition: DC-01 TO HOME OR SELFCARE


Is pt being admited?: No


Does the pt Need Aspirin: No


Condition: Stable


Instructions:  Acute Bronchitis (ED)


Additional Instructions: 


Follow-up with a primary care doctor in 3-5 days or if symptoms worsen and 

continue return to emergency room as soon as possible. 


Prescriptions: 


Azithromycin [Zithromax Z-МАРИНА] 250 mg PO DAILY #6 tablet


Benzonatate [Tessalon Perle] 100 mg PO Q8H PRN #20 capsule


 PRN Reason: Cough


Ibuprofen [Motrin] 600 mg PO Q8H PRN #20 tablet


 PRN Reason: Pain


Prednisone [predniSONE 10 mg (6-Day Pack, 21 Tabs)] 10 mg PO .TAPER #1 tab.ds.pk


Referrals: 


PRIMARY CARE,MD [Primary Care Provider] - 3-5 Days


PATRICIO LANDAVERDE MD [Staff Physician] - 3-5 Days


[facility] [Outside] - 3-5 Days


[facility] [Outside] - 3-5 Days


Forms:  Work/School Release Form(ED)

## 2019-02-15 ENCOUNTER — HOSPITAL ENCOUNTER (EMERGENCY)
Dept: HOSPITAL 5 - ED | Age: 34
LOS: 1 days | Discharge: HOME | End: 2019-02-16
Payer: COMMERCIAL

## 2019-02-15 DIAGNOSIS — I10: ICD-10-CM

## 2019-02-15 DIAGNOSIS — J02.9: Primary | ICD-10-CM

## 2019-02-15 DIAGNOSIS — Z76.0: ICD-10-CM

## 2019-02-15 PROCEDURE — 87116 MYCOBACTERIA CULTURE: CPT

## 2019-02-15 PROCEDURE — 99283 EMERGENCY DEPT VISIT LOW MDM: CPT

## 2019-02-15 PROCEDURE — 87430 STREP A AG IA: CPT

## 2019-02-16 VITALS — SYSTOLIC BLOOD PRESSURE: 131 MMHG | DIASTOLIC BLOOD PRESSURE: 74 MMHG

## 2019-02-16 NOTE — EMERGENCY DEPARTMENT REPORT
ED General Adult HPI





- General


Chief complaint: Sore Throat


Stated complaint: SORE THROAT CHEST PAIN


Time Seen by Provider: 02/16/19 04:00


Source: patient, RN notes reviewed, old records reviewed


Mode of arrival: Ambulatory


Limitations: No Limitations





- History of Present Illness


Initial comments: 





This is a 33-year-old female who reports that she is not pregnant, and reports 

no delivery or birth within the past 6 weeks.  Presents to the ER with sore 

throat, runny nose, cough for the past few days.  Symptoms are preceded by 

multiple sick contacts in friends and family.  Symptoms constant, did not 

radiate anywhere, and do not have exacerbating or relieving factors.  The 

patient denies other complaints at this point in time.








Patient reports that she does not have access to her current outpatient 

hypertension medication.  She reports that she would like a refill of her 

Procardia.


-: Gradual


Location: mouth


Radiation: non-radiation


Severity scale (0 -10): 5


Quality: aching


Consistency: other


Improves with: other


Worsens with: other


Associated Symptoms: cough.  denies: confusion, chest pain, diaphoresis, 

fever/chills, headaches, loss of appetite, malaise, nausea/vomiting, rash, 

seizure, shortness of breath, syncope, weakness





- Related Data


                                Home Medications











 Medication  Instructions  Recorded  Confirmed  Last Taken


 


risperiDONE [Risperdal] 1 tab PO 1XW 12/02/18 12/02/18 12/01/18








                                  Previous Rx's











 Medication  Instructions  Recorded  Last Taken  Type


 


Ondansetron [Zofran Odt] 4 mg PO Q8HR PRN #20 tab.rapdis 12/02/18 Unknown Rx


 


Ibuprofen [Motrin 600 MG tab] 600 mg PO Q8H PRN #20 tablet 02/16/19 Unknown Rx


 


NIFEdipine [Nifedipine] 1 tab PO DAILY #30 capsule 02/16/19 Unknown Rx











                                    Allergies











Allergy/AdvReac Type Severity Reaction Status Date / Time


 


No Known Allergies Allergy   Verified 10/03/18 08:39














ED Review of Systems


ROS: 


Stated complaint: SORE THROAT CHEST PAIN


Other details as noted in HPI





Constitutional: denies: fever, malaise, weakness


Eyes: denies: eye discharge, vision change


ENT: congestion.  denies: epistaxis


Respiratory: cough


Cardiovascular: denies: chest pain


Gastrointestinal: denies: abdominal pain, nausea, vomiting


Genitourinary: denies: dysuria


Musculoskeletal: denies: back pain


Skin: denies: lesions


Neurological: denies: weakness


Psychiatric: denies: anxiety





ED Past Medical Hx





- Past Medical History


Hx Hypertension: Yes


Hx Diabetes: No


Hx Deep Vein Thrombosis: No


Hx Renal Disease: No


Hx Sickle Cell Disease: No


Hx Seizures: No


Hx Asthma: No


Hx HIV: No


Additional medical history: Vaginal delivery x 3





- Surgical History


Past Surgical History?: No





- Social History


Smoking Status: Never Smoker


Substance Use Type: None





- Medications


Home Medications: 


                                Home Medications











 Medication  Instructions  Recorded  Confirmed  Last Taken  Type


 


Ondansetron [Zofran Odt] 4 mg PO Q8HR PRN #20 tab.rapdis 12/02/18  Unknown Rx


 


risperiDONE [Risperdal] 1 tab PO 1XW 12/02/18 12/02/18 12/01/18 History


 


Ibuprofen [Motrin 600 MG tab] 600 mg PO Q8H PRN #20 tablet 02/16/19  Unknown Rx


 


NIFEdipine [Nifedipine] 1 tab PO DAILY #30 capsule 02/16/19  Unknown Rx














ED Physical Exam





- General


Limitations: No Limitations


General appearance: alert, in no apparent distress





- Head


Head exam: Present: atraumatic, normocephalic





- Eye


Eye exam: Present: normal appearance, PERRL, EOMI.  Absent: nystagmus





- ENT


ENT exam: Present: normal exam, normal orophraynx, mucous membranes moist, 

normal external ear exam (patient speaking in full sentences with no stridor)





- Neck


Neck exam: Present: normal inspection, full ROM.  Absent: tenderness, 

meningismus





- Respiratory


Respiratory exam: Present: normal lung sounds bilaterally.  Absent: respiratory 

distress, chest wall tenderness





- Cardiovascular


Cardiovascular Exam: Present: regular rate, normal rhythm, normal heart sounds. 

 Absent: bradycardia, tachycardia, irregular rhythm, systolic murmur, diastolic 

murmur, rubs, gallop





- GI/Abdominal


GI/Abdominal exam: Present: soft.  Absent: distended, tenderness, guarding, 

rebound, rigid, pulsatile mass





- Extremities Exam


Extremities exam: Present: normal inspection, full ROM, other (2+ pulses noted 

in the bilateral upper, lower extremities.  Compartments soft.  No long bony 

tenderness.  The pelvis is stable.).  Absent: pedal edema, joint swelling, calf 

tenderness





- Back Exam


Back exam: Present: normal inspection, full ROM.  Absent: tenderness, CVA 

tenderness (R), paraspinal tenderness, vertebral tenderness





- Neurological Exam


Neurological exam: Present: alert, oriented X3, CN II-XII intact, normal gait, 

other (Extraocular movements intact.  Tongue midline.  No facial droop.  Facial 

sensation intact to light touch in the V1, V2, V3 distribution bilaterally.  5 

and 5 strength in 4 extremities..  Sensation is intact to light touch in 4 

extremities.).  Absent: motor sensory deficit





- Psychiatric


Psychiatric exam: Present: normal affect, normal mood





- Skin


Skin exam: Present: warm, dry, intact, normal color.  Absent: rash





ED Course


                                   Vital Signs











  02/15/19 02/16/19





  22:00 04:14


 


Temperature 97.9 F 98.2 F


 


Pulse Rate 65 72


 


Respiratory 18 17





Rate  


 


Blood Pressure 165/96 


 


Blood Pressure  131/70





[Right]  


 


O2 Sat by Pulse 100 97





Oximetry  














ED Medical Decision Making





- Lab Data








                                   Vital Signs











  02/15/19 02/16/19





  22:00 04:14


 


Temperature 97.9 F 98.2 F


 


Pulse Rate 65 72


 


Respiratory 18 17





Rate  


 


Blood Pressure 165/96 


 


Blood Pressure  131/70





[Right]  


 


O2 Sat by Pulse 100 97





Oximetry  











                                   Lab Results











  02/15/19 Range/Units





  22:00 


 


Group A Strep Rapid  Negative  (Negative)  














- Medical Decision Making





Differential diagnosis, including not limited to: Enteritis, viral syndrome, 

medication refill





Assessment and plan: 33-year-old female with probable simple viral pharyngitis. 

 The patient is afebrile, with reassuring vital signs with the exception of 

elevated blood pressure which is chronic.  The patient is low risk by Cent[employer] 

score, and has a negative strep screen.  She is resting comfortably and has 

unremarkable physical examination.  She felt improved after supportive therapy. 

 She will be discharged with pain medication, we will refill her 

antihypertensive medication, and she is medically suitable to be discharged to 

follow up with the primary care doctor.


Critical care attestation.: 


If time is entered above; I have spent that time in minutes in the direct care 

of this critically ill patient, excluding procedure time.








ED Disposition


Clinical Impression: 


 Pharyngitis, Medication refill, Elevated blood pressure reading





Disposition: DC-01 TO HOME OR SELFCARE


Is pt being admited?: No


Does the pt Need Aspirin: No


Condition: Good


Instructions:  Pharyngitis (ED), Hypertension (ED)


Additional Instructions: 


Cultures were sent today, and results will be available in the next 3-5 days.  

Please have a primary care doctor contact the medical records department to 

obtain culture results.  Return to the emergency room right away with new, 

worsening or different symptoms.  Take a blood pressure medication as directed. 

 Long-term complications of hypertension and elevated blood pressure includes 

stroke, heart attack, disability, loss of quality of life.  Please return to the

 emergency room right away with new, worsening or different symptoms.


Referrals: 


[facility] [Provider Group] - as needed

## 2019-02-18 ENCOUNTER — HOSPITAL ENCOUNTER (EMERGENCY)
Dept: HOSPITAL 5 - ED | Age: 34
Discharge: HOME | End: 2019-02-18
Payer: COMMERCIAL

## 2019-02-18 PROCEDURE — 99282 EMERGENCY DEPT VISIT SF MDM: CPT

## 2019-02-18 NOTE — EMERGENCY DEPARTMENT REPORT
ED General Adult HPI





- General


Chief complaint: Upper Respiratory Infection


Stated complaint: COUGH/RASH/MED REFILL


Time Seen by Provider: 02/18/19 03:24


Source: patient


Mode of arrival: Ambulatory


Limitations: No Limitations





- History of Present Illness


Initial comments: 





33-year-old -American female who was seen here yesterday for sore throat 

continues to have sore throat despite taking meds.  Patient is requesting blood 

pressure medicine that is approved by Medicaid.  Patient had a rapid strep which

was negative.  Patient is afebrile.  Patient is followed by [facility].  

Patient denies any fever chills no nausea no vomiting no chest pain or shortness

of breathing.


Consistency: intermittent





- Related Data


                                Home Medications











 Medication  Instructions  Recorded  Confirmed  Last Taken


 


risperiDONE [Risperdal] 1 tab PO 1XW 12/02/18 12/02/18 12/01/18








                                  Previous Rx's











 Medication  Instructions  Recorded  Last Taken  Type


 


Ondansetron [Zofran Odt] 4 mg PO Q8HR PRN #20 tab.rapdis 12/02/18 Unknown Rx


 


Ibuprofen [Motrin 600 MG tab] 600 mg PO Q8H PRN #20 tablet 02/16/19 Unknown Rx


 


NIFEdipine [Nifedipine] 1 tab PO DAILY #30 capsule 02/16/19 Unknown Rx











                                    Allergies











Allergy/AdvReac Type Severity Reaction Status Date / Time


 


No Known Allergies Allergy   Verified 10/03/18 08:39














ED Review of Systems


ROS: 


Stated complaint: COUGH/RASH/MED REFILL


Other details as noted in HPI





Comment: All other systems reviewed and negative


ENT: throat pain





ED Past Medical Hx





- Past Medical History


Hx Hypertension: Yes


Hx Diabetes: No


Hx Deep Vein Thrombosis: No


Hx Renal Disease: No


Hx Sickle Cell Disease: No


Hx Seizures: No


Hx Asthma: No


Hx HIV: No


Additional medical history: Vaginal delivery x 3





- Surgical History


Past Surgical History?: No





- Social History


Smoking Status: Current Every Day Smoker


Substance Use Type: None





- Medications


Home Medications: 


                                Home Medications











 Medication  Instructions  Recorded  Confirmed  Last Taken  Type


 


Ondansetron [Zofran Odt] 4 mg PO Q8HR PRN #20 tab.rapdis 12/02/18  Unknown Rx


 


risperiDONE [Risperdal] 1 tab PO 1XW 12/02/18 12/02/18 12/01/18 History


 


Ibuprofen [Motrin 600 MG tab] 600 mg PO Q8H PRN #20 tablet 02/16/19  Unknown Rx


 


NIFEdipine [Nifedipine] 1 tab PO DAILY #30 capsule 02/16/19  Unknown Rx














ED Physical Exam





- General


Limitations: No Limitations


General appearance: alert, in no apparent distress





- Head


Head exam: Present: atraumatic, normocephalic





- Eye


Eye exam: Present: EOMI





- ENT


ENT exam: Present: mucous membranes moist





- Expanded ENT Exam


  ** Expanded


Throat exam: Positive: tonsillar erythema.  Negative: tonsillomegaly, tonsillar 

exudate





- Neck


Neck exam: Present: tenderness.  Absent: lymphadenopathy





- Respiratory


Respiratory exam: Present: normal lung sounds bilaterally.  Absent: respiratory 

distress





- Cardiovascular


Cardiovascular Exam: Present: regular rate, normal rhythm.  Absent: systolic 

murmur, diastolic murmur, rubs, gallop





ED Course


                                   Vital Signs











  02/18/19





  01:58


 


Temperature 98 F


 


Pulse Rate 68


 


Respiratory 16





Rate 


 


Blood Pressure 154/94


 


O2 Sat by Pulse 99





Oximetry 














ED Medical Decision Making





- Medical Decision Making





Patient has been evaluated by this provider in fast track.


I took the time to look up patient's nifedipine and found it with good Rx of $14

 and 98 since at [facility].  Patient will be given a good Rx cards.  I discussed 

the patient she should follow up at [place] or her primary care provider is 

supposed they are able to do a prior authorization to get her nifedipine covered

 under her insurance.  Patient verbalized understanding.  I discussed the 

patient to continue with her ibuprofen increase her fluid intake advance her die

t as tolerated.  Patient verbalized understanding


Critical care attestation.: 


If time is entered above; I have spent that time in minutes in the direct care 

of this critically ill patient, excluding procedure time.








ED Disposition


Clinical Impression: 


 Medication refill





Pharyngitis


Qualifiers:


 Pharyngitis/tonsillitis etiology: unspecified etiology Qualified Code(s): J02.9

 - Acute pharyngitis, unspecified





Disposition: DC-01 TO HOME OR SELFCARE


Is pt being admited?: No


Does the pt Need Aspirin: No


Condition: Stable


Additional Instructions: 


Please fill your prescription at [facility] using a good RX cart.  Please continue 

with the ibuprofen increase her fluid intake advance her diet as tolerated and 

follow-up with degree provider.


Referrals: 


[facility] [Outside] - 3-5 Days

## 2019-02-19 ENCOUNTER — HOSPITAL ENCOUNTER (EMERGENCY)
Dept: HOSPITAL 5 - ED | Age: 34
Discharge: HOME | End: 2019-02-19
Payer: COMMERCIAL

## 2019-02-19 PROCEDURE — 99283 EMERGENCY DEPT VISIT LOW MDM: CPT

## 2019-02-19 PROCEDURE — 99282 EMERGENCY DEPT VISIT SF MDM: CPT

## 2019-02-19 NOTE — EMERGENCY DEPARTMENT REPORT
Pink [facility]


Chief Complaint: Eye Problems


Stated Complaint: R EYE REDNESS/POSS PINK EYE


Time Seen by Provider: 02/19/19 08:03


Duration: 3 Days


Side: Right


Severity: moderate


Symptoms: Yes Eye Itching, Yes Eye Redness, Yes Mucous Drainage, Yes Purulent 

Drainage, Yes Preceding URI, No Eye Pain, No Blurred Vision, No H/O Allergic 

Rhinitis, No Contact Lens Use, No Trauma, No Fever





ED Review of Systems


ROS: 


Stated complaint: R EYE REDNESS/POSS PINK EYE


Other details as noted in HPI





Comment: All other systems reviewed and negative





ED Past Medical Hx





- Past Medical History


Hx Hypertension: Yes


Hx Diabetes: No


Hx Deep Vein Thrombosis: No


Hx Renal Disease: No


Hx Sickle Cell Disease: No


Hx Seizures: No


Hx Psychiatric Treatment: Yes


Hx Asthma: No


Hx HIV: No


Additional medical history: Vaginal delivery x 3





- Surgical History


Past Surgical History?: No





- Social History


Smoking Status: Current Some Day Smoker


Substance Use Type: None





- Medications


Home Medications: 


                                Home Medications











 Medication  Instructions  Recorded  Confirmed  Last Taken  Type


 


Ondansetron [Zofran Odt] 4 mg PO Q8HR PRN #20 tab.rapdis 12/02/18  Unknown Rx


 


risperiDONE [Risperdal] 1 tab PO 1XW 12/02/18 12/02/18 12/01/18 History


 


Ibuprofen [Motrin 600 MG tab] 600 mg PO Q8H PRN #20 tablet 02/16/19  Unknown Rx


 


NIFEdipine [Nifedipine] 1 tab PO DAILY #30 capsule 02/16/19  Unknown Rx


 


Gentamicin 0.3% Ophth Soln 2 drops OP Q4H #1 bottle 02/19/19  Unknown Rx


 


Naphazoline HCl/Pheniramine 2 drops OP BID #1 bottle 02/19/19  Unknown Rx





[Naphcon-A Eye Drops]     














Pink Eye Exam





- Exam


General: 


Vital signs noted. No distress. Alert and acting appropriately.





Eye Exam: Right Injection, Right Mucous Discharge, Right Purulent Discharge, 

Neither Chemosis, Neither Abnormal Pupil, Neither EOMI, Neither Eye Foreign 

Body, Neither Lid Foreign Body, Neither Fluorescein Uptake, Neither Fluorescein 

Uptake (slit lamp), Neither Cell/Flare (slit lamp), Neither Corneal Edema, 

Neither Photophobia


HEENT: No Nasal Congestion, No Pharyngeal Erythema


Remainder of HEENT: Normal


Lungs: Yes Clear Lung Sounds, Yes Good Air Exchange, No Wheezes, No Stridor, No 

Cough, No Nasal Flaring





ED Course


                                   Vital Signs











  02/19/19 02/19/19 02/19/19





  04:37 05:36 07:52


 


Temperature 98.1 F  98.1 F


 


Pulse Rate 71  63


 


Respiratory 18  18





Rate   


 


Blood Pressure 182/95  182/95


 


Blood Pressure  156/93 





[Right]   


 


O2 Sat by Pulse 98  98





Oximetry   














ED Medical Decision Making





- Medical Decision Making





Patient be treated with meds for symptomatic relief will be discharged home.


Critical care attestation.: 


If time is entered above; I have spent that time in minutes in the direct care 

of this critically ill patient, excluding procedure time.








ED Disposition


Clinical Impression: 


 Bacterial conjunctivitis





Disposition: DC-01 TO HOME OR SELFCARE


Is pt being admited?: No


Does the pt Need Aspirin: No


Condition: Stable


Instructions:  Conjunctivitis (ED)


Referrals: 


KLAUDIA MASTERSON [Primary Care Provider] - 3-5 Days


Time of Disposition: 08:14

## 2019-11-13 ENCOUNTER — HOSPITAL ENCOUNTER (EMERGENCY)
Dept: HOSPITAL 5 - ED | Age: 34
Discharge: HOME | End: 2019-11-13
Payer: COMMERCIAL

## 2019-11-13 VITALS — SYSTOLIC BLOOD PRESSURE: 132 MMHG | DIASTOLIC BLOOD PRESSURE: 87 MMHG

## 2019-11-13 DIAGNOSIS — J20.9: Primary | ICD-10-CM

## 2019-11-13 DIAGNOSIS — I10: ICD-10-CM

## 2019-11-13 DIAGNOSIS — J01.90: ICD-10-CM

## 2019-11-13 PROCEDURE — 71046 X-RAY EXAM CHEST 2 VIEWS: CPT

## 2019-11-13 PROCEDURE — 99283 EMERGENCY DEPT VISIT LOW MDM: CPT

## 2019-11-13 NOTE — XRAY REPORT
CHEST 2 VIEWS 



INDICATION:  productive cough sob.



COMPARISON:  None



FINDINGS:

Support devices: None.



Heart: Within normal limits. 

Lungs/pleura: No acute air space or interstitial disease.  No pneumothorax.



Additional findings: None.



IMPRESSION:

Normal chest x-ray.



Signer Name: Dhruv Flores Jr, MD 

Signed: 11/13/2019 12:15 PM

 Workstation Name: [id number]

## 2019-11-13 NOTE — EMERGENCY DEPARTMENT REPORT
- General


Chief Complaint: Upper Respiratory Infection


Stated Complaint: COLD SX/PAIN IN CHEST


Time Seen by Provider: 11/13/19 11:43


Source: patient


Mode of arrival: Ambulatory


Limitations: No Limitations





- History of Present Illness


Initial Comments: 





This 34-year-old female who is presenting with cough congestion and sinus 

tenderness for the last 2-3 days.  Patient states she has pain in her mid face 

and forehead of the present for several days.  This is above the symptom that 

started her symptoms.  Pain is a 8 out of 10 in severity.  Patient's now has 

developed a cough which is productive of clear to yellow sputum.  Patient has 

subjective fevers and chills.  She complains of shortness of breath.  Patient 

denies nausea vomiting diarrhea neck stiffness or sore throat.





- Related Data


                                Home Medications











 Medication  Instructions  Recorded  Confirmed  Last Taken


 


risperiDONE [Risperdal] 1 tab PO 1XW 12/02/18 12/02/18 12/01/18








                                  Previous Rx's











 Medication  Instructions  Recorded  Last Taken  Type


 


Ondansetron [Zofran Odt] 4 mg PO Q8HR PRN #20 tab.rapdis 12/02/18 Unknown Rx


 


Ibuprofen [Motrin 600 MG tab] 600 mg PO Q8H PRN #20 tablet 02/16/19 Unknown Rx


 


NIFEdipine [Nifedipine] 1 tab PO DAILY #30 capsule 02/16/19 Unknown Rx


 


Gentamicin 0.3% Ophth Soln 2 drops OP Q4H #1 bottle 02/19/19 Unknown Rx


 


Naphazoline HCl/Pheniramine 2 drops OP BID #1 bottle 02/19/19 Unknown Rx





[Naphcon-A Eye Drops]    


 


Amoxicillin/Potassium Clav 1 each PO BID #14 tablet 11/13/19 Unknown Rx





[Augmentin 875-125 Tablet]    


 


Benzonatate [Tessalon Perles] 100 mg PO Q8HR #10 capsule 11/13/19 Unknown Rx


 


Fluticasone [Flonase] 1 spray NS QDAY #1 bottle 11/13/19 Unknown Rx


 


predniSONE [Deltasone] 20 mg PO QDAY #5 tab 11/13/19 Unknown Rx











                                    Allergies











Allergy/AdvReac Type Severity Reaction Status Date / Time


 


No Known Allergies Allergy   Verified 10/03/18 08:39














ED Review of Systems


ROS: 


Stated complaint: COLD SX/PAIN IN CHEST


Other details as noted in HPI





Comment: All other systems reviewed and negative





ED Past Medical Hx





- Past Medical History


Previous Medical History?: Yes


Hx Hypertension: Yes


Hx Diabetes: No


Hx Deep Vein Thrombosis: No


Hx Renal Disease: No


Hx Sickle Cell Disease: No


Hx Seizures: No


Hx Psychiatric Treatment: Yes


Hx Asthma: No


Hx HIV: No


Additional medical history: Vaginal delivery x 3





- Surgical History


Past Surgical History?: No





- Social History


Smoking Status: Never Smoker





- Medications


Home Medications: 


                                Home Medications











 Medication  Instructions  Recorded  Confirmed  Last Taken  Type


 


Ondansetron [Zofran Odt] 4 mg PO Q8HR PRN #20 tab.rapdis 12/02/18  Unknown Rx


 


risperiDONE [Risperdal] 1 tab PO 1XW 12/02/18 12/02/18 12/01/18 History


 


Ibuprofen [Motrin 600 MG tab] 600 mg PO Q8H PRN #20 tablet 02/16/19  Unknown Rx


 


NIFEdipine [Nifedipine] 1 tab PO DAILY #30 capsule 02/16/19  Unknown Rx


 


Gentamicin 0.3% Ophth Soln 2 drops OP Q4H #1 bottle 02/19/19  Unknown Rx


 


Naphazoline HCl/Pheniramine 2 drops OP BID #1 bottle 02/19/19  Unknown Rx





[Naphcon-A Eye Drops]     


 


Amoxicillin/Potassium Clav 1 each PO BID #14 tablet 11/13/19  Unknown Rx





[Augmentin 875-125 Tablet]     


 


Benzonatate [Tessalon Perles] 100 mg PO Q8HR #10 capsule 11/13/19  Unknown Rx


 


Fluticasone [Flonase] 1 spray NS QDAY #1 bottle 11/13/19  Unknown Rx


 


predniSONE [Deltasone] 20 mg PO QDAY #5 tab 11/13/19  Unknown Rx














ED Physical Exam





- General


Limitations: No Limitations


General appearance: alert, in no apparent distress





- Head


Head exam: Present: atraumatic, normocephalic





- Eye


Eye exam: Present: normal appearance





- ENT


ENT exam: Present: mucous membranes moist





- Neck


Neck exam: Present: normal inspection





- Respiratory


Respiratory exam: Present: normal lung sounds bilaterally.  Absent: respiratory 

distress, wheezes, rales





- Cardiovascular


Cardiovascular Exam: Present: regular rate, normal rhythm, normal heart sounds. 

 Absent: systolic murmur, diastolic murmur, rubs, gallop





- GI/Abdominal


GI/Abdominal exam: Present: soft, normal bowel sounds.  Absent: distended, 

tenderness, guarding, rebound





- Extremities Exam


Extremities exam: Present: normal inspection





- Back Exam


Back exam: Present: normal inspection





- Neurological Exam


Neurological exam: Present: alert, oriented X3





- Psychiatric


Psychiatric exam: Present: normal affect, normal mood





- Skin


Skin exam: Present: warm, dry, intact, normal color.  Absent: rash





ED Course





                                   Vital Signs











  11/13/19





  10:47


 


Temperature 99.1 F


 


Pulse Rate 69


 


Respiratory 16





Rate 


 


Blood Pressure 132/87


 


O2 Sat by Pulse 94





Oximetry 














ED Medical Decision Making





- Radiology Data


Radiology results: report reviewed (chest x-ray is within normal limits)





- Medical Decision Making





Patient is a 34-year-old  female who is presenting with cough, congestion

 and facial pain.  Clinically the patient has a sinus infection.  Patient be 

started on Augmentin.  X-ray of the chest was done to rule out pneumonia since 

the patient does have a productive cough and her admitting O2 sat saturations 

94%.  X-ray was negative for pneumonia.  Patient will be discharged home with 

medication for symptomatic relief as well.


Critical care attestation.: 


If time is entered above; I have spent that time in minutes in the direct care 

of this critically ill patient, excluding procedure time.








ED Disposition


Clinical Impression: 


Acute sinusitis


Qualifiers:


 Sinusitis location: frontal Recurrence: non-recurrent Qualified Code(s): J01.10

 - Acute frontal sinusitis, unspecified





Acute bronchitis


Qualifiers:


 Bronchitis organism: unspecified organism Qualified Code(s): J20.9 - Acute 

bronchitis, unspecified





Disposition: DC-01 TO HOME OR SELFCARE


Is pt being admited?: No


Does the pt Need Aspirin: No


Condition: Stable


Instructions:  Acute Bronchitis (ED), Acute Bacterial Rhinosinusitis (ED)


Referrals: 


[facility], [place] [Referring] - 3-5 Days


Time of Disposition: 12:35

## 2021-01-02 ENCOUNTER — HOSPITAL ENCOUNTER (EMERGENCY)
Dept: HOSPITAL 5 - ED | Age: 36
Discharge: HOME | End: 2021-01-02
Payer: COMMERCIAL

## 2021-01-02 VITALS — DIASTOLIC BLOOD PRESSURE: 60 MMHG | SYSTOLIC BLOOD PRESSURE: 125 MMHG
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
## 2021-01-02 DIAGNOSIS — I10: ICD-10-CM

## 2021-01-02 DIAGNOSIS — Z79.2: ICD-10-CM

## 2021-01-02 DIAGNOSIS — R09.89: Primary | ICD-10-CM
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
## 2021-01-02 DIAGNOSIS — Z79.899: ICD-10-CM
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
## 2021-01-02 PROCEDURE — 99282 EMERGENCY DEPT VISIT SF MDM: CPT

## 2021-01-02 PROCEDURE — 93005 ELECTROCARDIOGRAM TRACING: CPT
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
## 2021-06-16 NOTE — EMERGENCY DEPARTMENT REPORT
ED Eye Problem HPI





- General


Chief complaint: Eye Problems


Stated complaint: RED IN LEFT EYE


Time Seen by Provider: 06/16/21 15:12


Source: patient


Mode of arrival: Ambulatory


Limitations: No Limitations





- History of Present Illness


Initial comments: 





34 yo comes to ER with l red eye. Her and boyfriend had URI recently and then 

she got pink eye. No contacts. No welding or other trauma. L eye matted with 

discharge this AM on awakening 





Vision normal


EOMS intact


MD chief complaint: eye redness


-: Gradual, days(s)


Onset Description: gradual


Place: home


If Injury: none


Eye Symptoms: redness


Severity: mild


Consistency: constant


Context: recent uri


Associated Symptoms: none


Treatments Prior to Arrival: none





- Related Data


                                Home Medications











 Medication  Instructions  Recorded  Confirmed  Last Taken


 


risperiDONE [Risperdal] 1 tab PO 1XW 12/02/18 12/02/18 12/01/18








                                  Previous Rx's











 Medication  Instructions  Recorded  Last Taken  Type


 


NIFEdipine [Nifedipine] 1 tab PO DAILY #30 capsule 02/16/19 Unknown Rx


 


Polymyxin B Sulf/Trimethoprim 1 drop OS QID #1 bottle 06/16/21 Unknown Rx





[Polytrim Eye Drops    





29230ymseh/0.1%]    











                                    Allergies











Allergy/AdvReac Type Severity Reaction Status Date / Time


 


No Known Allergies Allergy   Verified 10/03/18 08:39














ED Review of Systems


ROS: 


Stated complaint: RED IN LEFT EYE


Other details as noted in HPI





Comment: All other systems reviewed and negative





ED Past Medical Hx





- Past Medical History


Previous Medical History?: Yes


Hx Hypertension: Yes


Hx Diabetes: No


Hx Deep Vein Thrombosis: No


Hx Renal Disease: No


Hx Sickle Cell Disease: No


Hx Seizures: No


Hx Psychiatric Treatment: Yes


Hx Asthma: No


Hx HIV: No


Additional medical history: Vaginal delivery x 3





- Surgical History


Past Surgical History?: No





- Family History


Family history: no significant





- Social History


Smoking Status: Never Smoker


Substance Use Type: None





- Medications


Home Medications: 


                                Home Medications











 Medication  Instructions  Recorded  Confirmed  Last Taken  Type


 


risperiDONE [Risperdal] 1 tab PO 1XW 12/02/18 12/02/18 12/01/18 History


 


NIFEdipine [Nifedipine] 1 tab PO DAILY #30 capsule 02/16/19  Unknown Rx


 


Polymyxin B Sulf/Trimethoprim 1 drop OS QID #1 bottle 06/16/21  Unknown Rx





[Polytrim Eye Drops     





42994rsjyf/0.1%]     














ED Physical Exam





- General


Limitations: No Limitations


General appearance: alert, in no apparent distress





- Head


Head exam: Present: atraumatic, normocephalic





- Eye


Eye exam: Present: normal appearance, PERRL, EOMI, other





- ENT


ENT exam: Present: mucous membranes moist





- Neck


Neck exam: Present: normal inspection





- Respiratory


Respiratory exam: Present: normal lung sounds bilaterally.  Absent: respiratory 

distress





- Cardiovascular


Cardiovascular Exam: Present: regular rate, normal rhythm.  Absent: systolic 

murmur, diastolic murmur, rubs, gallop





- GI/Abdominal


GI/Abdominal exam: Present: soft, normal bowel sounds





- Extremities Exam


Extremities exam: Present: normal inspection





- Back Exam


Back exam: Present: normal inspection





- Neurological Exam


Neurological exam: Present: alert, oriented X3





- Psychiatric


Psychiatric exam: Present: normal affect, normal mood





- Skin


Skin exam: Present: warm, dry, intact, normal color.  Absent: rash





ED Course


                                   Vital Signs











  06/16/21





  14:30


 


Temperature 98.2 F


 


Pulse Rate 69


 


Respiratory 16





Rate 


 


Blood Pressure 118/75


 


O2 Sat by Pulse 98





Oximetry 














ED Medical Decision Making





- Medical Decision Making








                                   Vital Signs











  06/16/21





  14:30


 


Temperature 98.2 F


 


Pulse Rate 69


 


Respiratory 16





Rate 


 


Blood Pressure 118/75


 


O2 Sat by Pulse 98





Oximetry 








pink eye on exam


no trauma


globe intact


eom intact


recent uri





non ill non toxic on exam





dc home with dc plan of care including optha follow up. pt verbalizes unders

tanding of plan of care. 





- Differential Diagnosis


pink eye- bacteria/viral/allergic


Critical care attestation.: 


If time is entered above; I have spent that time in minutes in the direct care 

of this critically ill patient, excluding procedure time.








ED Disposition


Clinical Impression: 


 Pink eye disease of left eye





Disposition: DC-01 TO HOME OR SELFCARE


Is pt being admited?: No


Does the pt Need Aspirin: No


Condition: Stable


Instructions:  Allergic Conjunctivitis, Adult


Additional Instructions: 


med as ordered today





good handwashing 





follow up Monday with eye MD or PCP


referrals below


Prescriptions: 


Polymyxin B Sulf/Trimethoprim [Polytrim Eye Drops 22033xctjf/0.1%] 1 drop OS QID

#1 bottle


Referrals: 


HARDIK PEÑA MD [Staff Physician] - 3-5 Days


KLAUDIA MASTERSON MD [Staff Physician] - 3-5 Days


Forms:  Work/School Release Form(ED)


Time of Disposition: 15:13

## 2021-08-02 NOTE — EMERGENCY DEPARTMENT REPORT
ED Head Trauma HPI





- General


Chief complaint: Head Injury


Stated complaint: HEAD INJURY


Source: patient


Mode of arrival: Ambulatory


Limitations: No Limitations





- History of Present Illness


Initial comments: 


35-year-old female presents to the ER today with complaints of head injury, neck

pain and thoracic back pain.  Patient states that 3 days ago she accidentally 

struck her head on a piece of metal that was attached to the water heater next 

to her dryer.  She states that "I hit my head heart".  She denies any LOC.  She 

states that since then she has been having headache, posterior neck pain, and 

thoracic back pain.  She denies any nausea, vomiting, numbness, tingling, focal 

weakness, chest pain, shortness of breath or any additional symptoms at this 

time.  He is not on any anticoagulants or any antiplatelets.  She denies any 

alcohol use at the time of the injury.





MD Complaint: head injury, other (neck and thoracic back pain )


-: Sudden, days(s) (3)





- Related Data


                                Home Medications











 Medication  Instructions  Recorded  Confirmed  Last Taken


 


risperiDONE [Risperdal] 1 tab PO 1XW 18








                                  Previous Rx's











 Medication  Instructions  Recorded  Last Taken  Type


 


NIFEdipine [Nifedipine] 1 tab PO DAILY #30 capsule 19 Unknown Rx


 


Polymyxin B Sulf/Trimethoprim 1 drop OS QID #1 bottle 21 Unknown Rx





[Polytrim Eye Drops    





70508bdrwn/0.1%]    


 


Butalb/Acetamin/Caff -40 1 tab PO Q6HR PRN #20 tab 21 Unknown Rx





[Fioricet -40]    


 


methOCARBAMOL [Robaxin TAB] 500 mg PO Q6H PRN #20 tablet 21 Unknown Rx











Allergies/Adverse reactions: 


                                    Allergies











Allergy/AdvReac Type Severity Reaction Status Date / Time


 


mushroom Allergy  Unknown Verified 21 19:37














ED Review of Systems


ROS: 


Stated complaint: HEAD INJURY


Other details as noted in HPI





Comment: All other systems reviewed and negative


Constitutional: denies: chills, fever


Eyes: denies: eye pain, eye discharge, vision change


ENT: denies: ear pain, throat pain, dental pain, hearing loss, epistaxis, 

congestion


Respiratory: denies: cough, shortness of breath, wheezing


Cardiovascular: denies: chest pain, palpitations, dyspnea on exertion, edema, 

syncope, paroxysmal nocturnal dyspnea


Gastrointestinal: denies: abdominal pain, nausea, vomiting, diarrhea, 

constipation, hematemesis, hematochezia


Genitourinary: denies: urgency, dysuria, discharge


Musculoskeletal: back pain, other (Neck pain).  denies: joint swelling, 

arthralgia


Skin: denies: rash, lesions, change in color, change in hair/nails, pruritus


Neurological: headache.  denies: weakness, numbness, paresthesias, confusion, 

abnormal gait, vertigo


Psychiatric: denies: anxiety, depression, auditory hallucinations, visual 

hallucinations, homicidal thoughts, suicidal thoughts


Hematological/Lymphatic: denies: easy bleeding, easy bruising





ED Past Medical Hx





- Past Medical History


Previous Medical History?: Yes


Hx Hypertension: Yes


Hx Diabetes: No


Hx Deep Vein Thrombosis: No


Hx Renal Disease: No


Hx Sickle Cell Disease: No


Hx Seizures: No


Hx Psychiatric Treatment: Yes


Hx Asthma: No


Hx HIV: No


Additional medical history: Vaginal delivery x 3





- Surgical History


Past Surgical History?: No





- Social History


Smoking Status: Never Smoker


Substance Use Type: None





- Medications


Home Medications: 


                                Home Medications











 Medication  Instructions  Recorded  Confirmed  Last Taken  Type


 


risperiDONE [Risperdal] 1 tab PO 1XW 18 History


 


NIFEdipine [Nifedipine] 1 tab PO DAILY #30 capsule 19  Unknown Rx


 


Polymyxin B Sulf/Trimethoprim 1 drop OS QID #1 bottle 21  Unknown Rx





[Polytrim Eye Drops     





64687ykwhl/0.1%]     


 


Butalb/Acetamin/Caff -40 1 tab PO Q6HR PRN #20 tab 21  Unknown Rx





[Fioricet -40]     


 


methOCARBAMOL [Robaxin TAB] 500 mg PO Q6H PRN #20 tablet 21  Unknown Rx














ED Physical Exam





- General


Limitations: No Limitations


General appearance: alert, in no apparent distress





- Head


Head exam: Present: atraumatic, normocephalic, normal inspection





- Eye


Eye exam: Present: normal appearance, PERRL, EOMI


Pupils: Present: normal accommodation





- ENT


ENT exam: Present: normal exam, mucous membranes moist, TM's normal bilaterally





- Neck


Neck exam: Present: normal inspection, tenderness (Tenderness to palpation to 

the left paraspinal muscle; mid to upper midline tenderness along the cervical 

spine.), full ROM (She does have full range of motion of the cervical spine but 

there is pain with range of motion.).  Absent: meningismus, lymphadenopathy, 

thyromegaly





- Respiratory


Respiratory exam: Present: normal lung sounds bilaterally.  Absent: respiratory 

distress, wheezes, rales, rhonchi





- Cardiovascular


Cardiovascular Exam: Present: regular rate, normal rhythm, normal heart sounds





- GI/Abdominal


GI/Abdominal exam: Present: soft.  Absent: distended, tenderness, guarding, 

rebound, rigid





- Back Exam


Back exam: Present: full ROM, paraspinal tenderness (Left upper to mid thoracic 

area), vertebral tenderness (Upper to mid thoracic spine)





- Neurological Exam


Neurological exam: Present: alert, oriented X3, CN II-XII intact, normal gait





- Psychiatric


Psychiatric exam: Present: normal affect, normal mood





ED Course


                                   Vital Signs











  21





  19:36


 


Temperature 98.2 F


 


Pulse Rate 64


 


Respiratory 19





Rate 


 


Blood Pressure 147/88





[Right] 


 


O2 Sat by Pulse 98





Oximetry 














- Radiology Data


Radiology results: report reviewed


Patient: LISA BAZAN                                                      

          MR#:   


[id number]          


: 1985                                                                

Acct:[id number]      


 


Age/Sex: 35 / F                                                                

ADM Date: 21     


 


Loc: ED       


Attending Dr:   


 


 


Ordering Physician: THOM DELA CRUZ  


Date of Service: 21  


Procedure(s): XR spine cervical 2-3V  


Accession Number(s): [id number]  


 


cc: THOM DELA CRUZ   


 


Fluoro Time In Minutes:   


 


CERVICAL SPINE 3 VIEWS  


 


 INDICATION / CLINICAL INFORMATION: Head injury 3 days ago with neck pain and 

headache.  


 


 COMPARISON: None available.  


 


 FINDINGS:  


 


 BONES / JOINT(S): The vertebral body heights and disc spaces are well-

maintained. No significant 


arthritis. There is no evidence of fracture or subluxation.  


 


 SOFT TISSUES: The prevertebral soft tissues are normal.  


 


 ADDITIONAL FINDINGS: The visualized lung apices are clear.  


 


 IMPRESSION: No acute abnormality.  


 


 Signer Name: Abel Burton MD   


 Signed: 2021 8:51 PM  


 Workstation Name: [id number]   


 


 


Transcribed By: RT  


Dictated By: Abel Burton MD  


Electronically Authenticated By: Abel Burton MD    


Signed Date/Time: 21                                


 


 


 


DD/DT: 21                                                            

  


TD/TT:





Patient: LISA BAZAN                                                      

          MR#:   


[id number]          


: 1985                                                                

Acct:[id number]      


 


Age/Sex: 35 / F                                                                

ADM Date: 21     


 


Loc: ED       


Attending Dr:   


 


 


Ordering Physician: THOM DELA CRUZ  


Date of Service: 21  


Procedure(s): XR spine thoracic 3V  


Accession Number(s): [id number]  


 


cc: THOM DELA CRUZ   


 


Fluoro Time In Minutes:   


 


THORACIC SPINE 3 VIEWS  


 


 INDICATION / CLINICAL INFORMATION: Head injury 3 days ago with back and 

shoulder pain.  


 


 COMPARISON: None available.  


 


 FINDINGS:  


 


 BONES / JOINT(S): The vertebral body heights and disc spaces are well-

maintained. No significant 


arthritis. The pedicles are intact. There is no evidence of acute fracture or 

subluxation.  


 


 SOFT TISSUES: No significant abnormality.  


 


 ADDITIONAL FINDINGS: None.  


 


 IMPRESSION: No acute abnormality.  


 


 Signer Name: Abel Burton MD   


 Signed: 2021 8:52 PM  


 Workstation Name: [id number]   


 


 


Transcribed By: RT  


Dictated By: Abel Burton MD  


Electronically Authenticated By: Abel Burton MD    


Signed Date/Time: 21                                


 


 


 


DD/DT: 21                                                            

  


TD/TT:





- Medical Decision Making


X-ray of the cervical spine and thoracic spine shows nothing acute.


Patient currently is awake alert oriented x3 with GCS of 15.  She has no focal 

neurological deficits on exam.  She has a normal gait in the ER.  She is not 

toxic or ill-appearin. she appears well-hydrated.


Her history, exam, diagnostic testing and current condition does not demonstrate

 signs of basilar skull fracture, clinically significant intracranial injury or 

cervical trauma requiring any additional testing, admission or transfer at this 

time..  Patient's vital signs have been stable.  Discussed imaging results, 

discussed suspected diagnosis and treatment plan with patient.  The patient 

condition is stable and appropriate for discharge.








Critical care attestation.: 


If time is entered above; I have spent that time in minutes in the direct care 

of this critically ill patient, excluding procedure time.








ED Disposition


Clinical Impression: 


 Head injury, Cervical strain, Strain of thoracic back region





Disposition:  PAT REG,NO TRIAGE


Is pt being admited?: No


Does the pt Need Aspirin: No


Condition: Stable


Instructions:  Facial or Scalp Contusion, Easy-to-Read, Muscle Strain


Additional Instructions: 


Recommend that you take the Fioricet, and the muscle relaxer as prescribed.  

Recommend that you follow-up with primary care doctor listed on your discharge 

instructions.  Return to the ER if your symptoms changes or worsens in any way.


Prescriptions: 


Butalb/Acetamin/Caff -40 [Fioricet -40] 1 tab PO Q6HR PRN #20 tab


 PRN Reason: Headache


methOCARBAMOL [Robaxin TAB] 500 mg PO Q6H PRN #20 tablet


 PRN Reason: muscle pain


Referrals: 


[facility] [Provider Group] - 3-5 Days


Forms:  Work/School Release Form(ED)


Time of Disposition: 21:08

## 2021-08-02 NOTE — XRAY REPORT
THORACIC SPINE 3 VIEWS



INDICATION / CLINICAL INFORMATION: Head injury 3 days ago with back and shoulder pain.



COMPARISON: None available.

 

FINDINGS:



BONES / JOINT(S): The vertebral body heights and disc spaces are well-maintained. No significant arth
ritis. The pedicles are intact. There is no evidence of acute fracture or subluxation.



SOFT TISSUES: No significant abnormality.



ADDITIONAL FINDINGS: None.



IMPRESSION: No acute abnormality.



Signer Name: Abel Burton MD 

Signed: 8/2/2021 8:52 PM

Workstation Name: [id number]

## 2021-08-02 NOTE — XRAY REPORT
CERVICAL SPINE 3 VIEWS



INDICATION / CLINICAL INFORMATION: Head injury 3 days ago with neck pain and headache.



COMPARISON: None available.

 

FINDINGS:



BONES / JOINT(S): The vertebral body heights and disc spaces are well-maintained. No significant arth
ritis. There is no evidence of fracture or subluxation.



SOFT TISSUES: The prevertebral soft tissues are normal.



ADDITIONAL FINDINGS: The visualized lung apices are clear.



IMPRESSION: No acute abnormality.



Signer Name: Abel Burton MD 

Signed: 8/2/2021 8:51 PM

Workstation Name: [id number]